# Patient Record
Sex: FEMALE | Race: WHITE | NOT HISPANIC OR LATINO | Employment: STUDENT | URBAN - METROPOLITAN AREA
[De-identification: names, ages, dates, MRNs, and addresses within clinical notes are randomized per-mention and may not be internally consistent; named-entity substitution may affect disease eponyms.]

---
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
## 2022-03-06 ENCOUNTER — HEALTH MAINTENANCE LETTER (OUTPATIENT)
Age: 22
End: 2022-03-06

## 2022-05-25 ENCOUNTER — OFFICE VISIT (OUTPATIENT)
Dept: URGENT CARE | Facility: URGENT CARE | Age: 22
End: 2022-05-25
Payer: COMMERCIAL

## 2022-05-25 VITALS
BODY MASS INDEX: 33.32 KG/M2 | OXYGEN SATURATION: 98 % | RESPIRATION RATE: 16 BRPM | TEMPERATURE: 98.2 F | DIASTOLIC BLOOD PRESSURE: 68 MMHG | HEIGHT: 65 IN | SYSTOLIC BLOOD PRESSURE: 140 MMHG | HEART RATE: 88 BPM | WEIGHT: 200 LBS

## 2022-05-25 DIAGNOSIS — J02.9 VIRAL PHARYNGITIS: Primary | ICD-10-CM

## 2022-05-25 LAB
DEPRECATED S PYO AG THROAT QL EIA: NEGATIVE
GROUP A STREP BY PCR: NOT DETECTED

## 2022-05-25 PROCEDURE — 99213 OFFICE O/P EST LOW 20 MIN: CPT | Performed by: PHYSICIAN ASSISTANT

## 2022-05-25 PROCEDURE — 87651 STREP A DNA AMP PROBE: CPT | Performed by: PHYSICIAN ASSISTANT

## 2022-05-25 NOTE — PROGRESS NOTES
"URGENT CARE VISIT:    SUBJECTIVE:   Anna Cardenas is a 21 year old female presenting with a chief complaint of sore throat.  Onset was 4 day(s) ago.   She denies the following symptoms: fever, chills, stuffy nose and cough - non-productive  Course of illness is improving today a little.    Treatment measures tried include Tylenol/Ibuprofen with temporary relief of symptoms.  Predisposing factors include None.    PMH:   Past Medical History:   Diagnosis Date     Acute suppurative otitis media without spontaneous rupture of eardrum      NONSPECIFIC MEDICAL HISTORY 02/03    hospitalized for RSV     Allergies: No known drug allergies   Medications:   Current Outpatient Medications   Medication Sig Dispense Refill     levonorgestrel (MIRENA) 20 MCG/24HR IUD 1 each (20 mcg) by Intrauterine route once       Social History:   Social History     Tobacco Use     Smoking status: Never Smoker     Smokeless tobacco: Never Used     Tobacco comment: parental exposure (outisde & not in vehicles only)   Substance Use Topics     Alcohol use: No       ROS:  Review of systems negative except as stated above.    OBJECTIVE:  BP (!) 140/68   Pulse 88   Temp 98.2  F (36.8  C) (Temporal)   Resp 16   Ht 1.651 m (5' 5\")   Wt 90.7 kg (200 lb)   SpO2 98%   BMI 33.28 kg/m    GENERAL APPEARANCE: healthy, alert and no distress  EYES: EOMI,  PERRL, conjunctiva clear  HENT: ear canals and TM's normal.  Moderately erythematous oropharynx. Uvula midline.  NECK: supple, nontender, no lymphadenopathy  RESP: lungs clear to auscultation - no rales, rhonchi or wheezes  CV: regular rates and rhythm, normal S1 S2, no murmur noted  SKIN: no suspicious lesions or rashes    Labs:    Results for orders placed or performed in visit on 05/25/22   Streptococcus A Rapid Screen w/Reflex to PCR - Clinic Collect     Status: Normal    Specimen: Throat; Swab   Result Value Ref Range    Group A Strep antigen Negative Negative       ASSESSMENT:    ICD-10-CM    1. " Viral pharyngitis  J02.9 Streptococcus A Rapid Screen w/Reflex to PCR - Clinic Collect     Group A Streptococcus PCR Throat Swab       PLAN:  Patient Instructions   Patient was educated on the natural course of viral throat infection. Strep PCR is pending. Conservative measures discussed including warm fluids, salt water gargles, Lozenges (Cepacol), and over-the-counter analgesics (Tylenol or Ibuprofen). See your primary care provider if symptoms worsen or do not improve in 7 days. Seek emergency care if you develop severe throat pain, or difficulty swallowing.     Patient verbalized understanding and is agreeable to plan. The patient was discharged ambulatory and in stable condition.    Mariola Mendoza PA-C ....................  5/25/2022   5:38 PM

## 2022-11-20 ENCOUNTER — HEALTH MAINTENANCE LETTER (OUTPATIENT)
Age: 22
End: 2022-11-20

## 2023-04-16 ENCOUNTER — HEALTH MAINTENANCE LETTER (OUTPATIENT)
Age: 23
End: 2023-04-16

## 2024-06-22 ENCOUNTER — HEALTH MAINTENANCE LETTER (OUTPATIENT)
Age: 24
End: 2024-06-22

## 2025-02-20 NOTE — PROGRESS NOTES
Assessment & Plan     General counseling and advice on contraceptive management  She is interested in IUD.   We discussed all options for birth control, she has no obvious risk factors to hormonal contraceptive.   We discussed procedure for placement.   We discussed potential risks and side effects.   Encouraged Condom use for STD protection and discussed need for secondary pregnancy prevention after placement.   STD screening done at this time.     Screening for HIV (human immunodeficiency virus)  - HIV Antigen Antibody Combo    Need for hepatitis C screening test  - Hepatitis C Screen Reflex to HCV RNA Quant and Genotype    Screen for STD (sexually transmitted disease)  - Chlamydia trachomatis PCR  - Neisseria gonorrhoeae PCR  - Treponema Abs w Reflex to RPR and Titer  - Wet prep  Wet prep showed few clue cells but asymptomatic, waiting to hear if she wants to treat or not.     33 minutes spent on the date of the encounter doing chart review, history and exam, documentation and further activities as noted above    Return in about 1 week (around 3/26/2021) for IUD placement.     Options for treatment and follow-up care were reviewed with the patient and/or guardian. Patient and/or guardian engaged in the decision making process and verbalized understanding of the options discussed and agreed with the final plan.     Maykel Boswell PA-C  [facility]      Subjective     Anna Cardenas is a 20 year old female who presents to clinic today for the following health issues    History of Present Illness       She eats 0-1 servings of fruits and vegetables daily.She consumes 1 sweetened beverage(s) daily.She exercises with enough effort to increase her heart rate 9 or less minutes per day.  She exercises with enough effort to increase her heart rate 3 or less days per week.   She is taking medications regularly.       Subjective:  Anna is a 20 year old female who came in for contraceptive  consult.  Current method of birth control is condoms.  Menstrual cycles occur approx every 28 days.  It lasts approx 3-5 days.  Cramping is noted to be Mild. Other complaints include None.      OB/GYN HX:    Menarche age 7th grade approximately 12 years old.   Currently sexually active: Yes  Has tried other BC: YES, Type: Condoms    Relevant past medical history for birth control use.   Stroke or Heart Attack: NO  Blood clots: NO  Family history of blood clots: NO   Hypertension: NO  Diabetes: NO  Hyperlipidemia:  NO  Seizures: NO  Cancer: NO  Migraine/Vascular headaches: NO  Breast disease or lump: NO  Smoking: NO  Age greater than 35: NO      Past Medical History:   Diagnosis Date     Acute suppurative otitis media without spontaneous rupture of eardrum      NONSPECIFIC MEDICAL HISTORY 02/03    hospitalized for RSV     Past medical history reviewed and updated  No current outpatient medications on file.  Allergies   Allergen Reactions     No Known Drug Allergies      Social History     Socioeconomic History     Marital status: Single     Spouse name: Not on file     Number of children: Not on file     Years of education: Not on file     Highest education level: Not on file   Occupational History     Not on file   Social Needs     Financial resource strain: Not on file     Food insecurity     Worry: Not on file     Inability: Not on file     Transportation needs     Medical: Not on file     Non-medical: Not on file   Tobacco Use     Smoking status: Never Smoker     Smokeless tobacco: Never Used     Tobacco comment: parental exposure (outisde & not in vehicles only)   Substance and Sexual Activity     Alcohol use: No     Drug use: No     Sexual activity: Never   Lifestyle     Physical activity     Days per week: Not on file     Minutes per session: Not on file     Stress: Not on file   Relationships     Social connections     Talks on phone: Not on file     Gets together: Not on file     Attends Zoroastrian service:  Not on file     Active member of club or organization: Not on file     Attends meetings of clubs or organizations: Not on file     Relationship status: Not on file     Intimate partner violence     Fear of current or ex partner: Not on file     Emotionally abused: Not on file     Physically abused: Not on file     Forced sexual activity: Not on file   Other Topics Concern      Service Not Asked     Blood Transfusions No     Caffeine Concern Not Asked     Occupational Exposure Not Asked     Hobby Hazards Not Asked     Sleep Concern Not Asked     Stress Concern Not Asked     Weight Concern Not Asked     Special Diet Not Asked     Back Care Not Asked     Exercise Not Asked     Bike Helmet Not Asked     Seat Belt Not Asked     Self-Exams Not Asked   Social History Narrative     Not on file     Smoker none   Family History   Problem Relation Age of Onset     Respiratory Father         States asthmatic symptoms until age 15yrs     Coronary Artery Disease Father      Cancer - colorectal Maternal Grandfather      Hypertension Maternal Grandfather      Diabetes Paternal Grandmother      Hypertension Paternal Grandmother      Lipids Paternal Grandmother      Thyroid Disease Paternal Grandmother      Respiratory Brother         1/2 brother with asthma         Review of Systems   Constitutional, HEENT, cardiovascular, pulmonary,, neuro, skin, and psych systems are negative, except as otherwise noted.      Objective    /78   Pulse 140   Temp 98.4  F (36.9  C) (Temporal)   Resp 16   Wt 69.9 kg (154 lb)   LMP  (LMP Unknown)   SpO2 99%   BMI 25.56 kg/m    Body mass index is 25.56 kg/m .  Physical Exam   GENERAL: healthy, alert and no distress  MS: no gross musculoskeletal defects noted, no edema  NEURO: Normal strength and tone, mentation intact and speech normal  PSYCH: mentation appears normal, affect normal/bright    No results found for any visits on 03/19/21.       supervision

## 2025-08-02 ENCOUNTER — HEALTH MAINTENANCE LETTER (OUTPATIENT)
Age: 25
End: 2025-08-02

## 2025-09-04 ENCOUNTER — OFFICE VISIT (OUTPATIENT)
Dept: FAMILY MEDICINE | Facility: CLINIC | Age: 25
End: 2025-09-04
Payer: COMMERCIAL

## 2025-09-04 VITALS
SYSTOLIC BLOOD PRESSURE: 135 MMHG | BODY MASS INDEX: 36.64 KG/M2 | TEMPERATURE: 99.4 F | HEIGHT: 66 IN | OXYGEN SATURATION: 99 % | HEART RATE: 114 BPM | RESPIRATION RATE: 17 BRPM | DIASTOLIC BLOOD PRESSURE: 80 MMHG | WEIGHT: 228 LBS

## 2025-09-04 DIAGNOSIS — N90.89 VULVAR MASS: ICD-10-CM

## 2025-09-04 DIAGNOSIS — R21 RASH: Primary | ICD-10-CM

## 2025-09-04 RX ORDER — TRIAMCINOLONE ACETONIDE 1 MG/G
CREAM TOPICAL 2 TIMES DAILY
Qty: 45 G | Refills: 0 | Status: SHIPPED | OUTPATIENT
Start: 2025-09-04

## 2025-09-04 ASSESSMENT — PAIN SCALES - GENERAL: PAINLEVEL_OUTOF10: NO PAIN (0)
